# Patient Record
Sex: MALE | Race: OTHER | HISPANIC OR LATINO | Employment: UNEMPLOYED | URBAN - METROPOLITAN AREA
[De-identification: names, ages, dates, MRNs, and addresses within clinical notes are randomized per-mention and may not be internally consistent; named-entity substitution may affect disease eponyms.]

---
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
## 2025-02-11 ENCOUNTER — HOSPITAL ENCOUNTER (EMERGENCY)
Facility: HOSPITAL | Age: 20
Discharge: HOME | End: 2025-02-11
Attending: EMERGENCY MEDICINE
Payer: COMMERCIAL

## 2025-02-11 VITALS
OXYGEN SATURATION: 100 % | HEART RATE: 70 BPM | SYSTOLIC BLOOD PRESSURE: 156 MMHG | DIASTOLIC BLOOD PRESSURE: 74 MMHG | WEIGHT: 160.94 LBS | TEMPERATURE: 98.3 F | RESPIRATION RATE: 18 BRPM

## 2025-02-11 DIAGNOSIS — M25.561 ACUTE PAIN OF RIGHT KNEE: ICD-10-CM

## 2025-02-11 DIAGNOSIS — M25.562 ACUTE PAIN OF LEFT KNEE: Primary | ICD-10-CM

## 2025-02-11 DIAGNOSIS — K59.00 CONSTIPATION: ICD-10-CM

## 2025-02-11 PROCEDURE — 99284 EMERGENCY DEPT VISIT MOD MDM: CPT | Performed by: EMERGENCY MEDICINE

## 2025-02-11 PROCEDURE — 99283 EMERGENCY DEPT VISIT LOW MDM: CPT

## 2025-02-11 RX ORDER — NAPROXEN 500 MG/1
500 TABLET ORAL ONCE
Status: COMPLETED | OUTPATIENT
Start: 2025-02-11 | End: 2025-02-11

## 2025-02-11 RX ORDER — NAPROXEN 500 MG/1
500 TABLET ORAL 2 TIMES DAILY WITH MEALS
Qty: 20 TABLET | Refills: 0 | Status: SHIPPED | OUTPATIENT
Start: 2025-02-11 | End: 2025-02-21

## 2025-02-11 RX ADMIN — NAPROXEN 500 MG: 500 TABLET ORAL at 13:47

## 2025-02-11 NOTE — ED PROVIDER NOTES
"Time reflects when diagnosis was documented in both MDM as applicable and the Disposition within this note       Time User Action Codes Description Comment    2/11/2025  1:38 PM Javier Mariano [M25.562] Acute pain of left knee     2/11/2025  1:38 PM Javier Mariano [M25.561] Acute pain of right knee     2/11/2025  1:38 PM Montserrat Javier Lees [K59.00] Constipation           ED Disposition       ED Disposition   Discharge    Condition   Stable    Date/Time   Tue Feb 11, 2025  1:38 PM    Comment   Elliot Apodaca discharge to home/self care.                   Assessment & Plan       Medical Decision Making      MDM/DDx: Bilateral knee pain - contusion, musculoskeletal sprain/strain, no clinical evidence of hemarthrosis, fracture, dislocation or other derangement.                     Constipation - poor dietary intake of fiber and water, no clinical concern for bowel obstruction, colonic mass or other malignancy.    A/P: Will treat symptoms, recommend dietary changes, supportive care at home and follow-up with PCP.    Risk  Prescription drug management.             Medications   naproxen (NAPROSYN) tablet 500 mg (500 mg Oral Given 2/11/25 1347)       ED Risk Strat Scores            CRAFFT      Flowsheet Row Most Recent Value   CRAFFT Initial Screen: During the past 12 months, did you:    1. Drink any alcohol (more than a few sips)?  No Filed at: 02/11/2025 1345   2. Smoke any marijuana or hashish No Filed at: 02/11/2025 1345   3. Use anything else to get high? (\"anything else\" includes illegal drugs, over the counter and prescription drugs, and things that you sniff or 'nam')? No Filed at: 02/11/2025 1345                                          History of Present Illness       Chief Complaint   Patient presents with    Knee Pain     Bilateral knee pain after landing on both bent knees in a ball pit at the [facility]       History reviewed. No pertinent past medical history.   History reviewed. No pertinent " surgical history.   History reviewed. No pertinent family history.   Social History     Tobacco Use    Smoking status: Never    Smokeless tobacco: Never   Vaping Use    Vaping status: Never Used   Substance Use Topics    Alcohol use: Never    Drug use: Never      E-Cigarette/Vaping    E-Cigarette Use Never User       E-Cigarette/Vaping Substances    Nicotine No     THC No     CBD No     Flavoring No     Other No     Unknown No       I have reviewed and agree with the history as documented.     19-year-old male presents for complaint of bilateral knee pain after jumping into a ball pit at a recreation center yesterday.  He states he landed on his knees/shins and had immediate pain.  He was able to self assist getting out of the ball pit and continue ambulating.  He is observed ambulating into the emergency department without difficulty.  He states both knees hurt at the extremes of flexion or extension but in mid position, including standing with his knees partially flexed is comfortable.  He has not taken anything for the pain.  He denies other pain or injury from this incident.  He has an abrasion on his left shin that he states is from a different incident.    Additionally he complains of feeling like his bowel movements are inadequately evacuating each day.  However, he has no nausea or vomiting with dietary intake and maintains a normal appetite.  He has a bowel movement daily but just feels the urge to have more.    No hematochezia, melena or hematemesis. No change in symptoms w/PO intake, BM or voiding. No recent travel or similar sick contacts. Denies f/c, CP, SOB, n/v/d. 12 system ROS o/w negative.      History provided by:  Patient and medical records  Knee Pain  Location:  Knee  Time since incident:  1 day  Injury: yes    Knee location:  L knee and R knee  Pain details:     Quality:  Dull    Radiates to:  Does not radiate    Severity:  Mild    Onset quality:  Sudden    Duration:  1 day    Timing:   Intermittent    Progression:  Unchanged  Chronicity:  New  Dislocation: no    Prior injury to area:  Yes  Relieved by:  None tried  Worsened by:  Extension and flexion  Ineffective treatments:  None tried  Associated symptoms: no back pain, no decreased ROM, no fatigue, no fever, no muscle weakness, no neck pain, no numbness, no stiffness, no swelling and no tingling    Risk factors: no obesity        Review of Systems   Constitutional:  Negative for chills, diaphoresis, fatigue and fever.   HENT:  Negative for rhinorrhea, sore throat and trouble swallowing.    Respiratory:  Negative for cough, chest tightness, shortness of breath and wheezing.    Cardiovascular:  Negative for chest pain, palpitations and leg swelling.   Gastrointestinal:  Negative for abdominal distention, abdominal pain, constipation, diarrhea, nausea and vomiting.   Genitourinary:  Negative for difficulty urinating, dysuria, flank pain, frequency, hematuria and urgency.   Musculoskeletal:  Positive for arthralgias (b/l knees). Negative for back pain, myalgias, neck pain, neck stiffness and stiffness.   Skin:  Negative for pallor and rash.   Neurological:  Negative for dizziness, weakness, light-headedness and headaches.   Hematological:  Negative for adenopathy.   Psychiatric/Behavioral:  Negative for confusion. The patient is not nervous/anxious.    All other systems reviewed and are negative.          Objective       ED Triage Vitals [02/11/25 1335]   Temperature Pulse Blood Pressure Respirations SpO2 Patient Position - Orthostatic VS   98.3 °F (36.8 °C) 70 156/74 18 100 % Sitting      Temp Source Heart Rate Source BP Location FiO2 (%) Pain Score    Oral Monitor Left arm -- 6      Vitals      Date and Time Temp Pulse SpO2 Resp BP Pain Score FACES Pain Rating User   02/11/25 1347 -- -- -- -- -- 5 -- AS   02/11/25 1335 98.3 °F (36.8 °C) 70 100 % 18 156/74 6 -- SN            Physical Exam  Vitals reviewed.   Constitutional:       General: He is  not in acute distress.     Appearance: He is well-developed. He is not ill-appearing, toxic-appearing or diaphoretic.   HENT:      Head: Normocephalic and atraumatic.      Right Ear: External ear normal.      Left Ear: External ear normal.      Nose: Nose normal.      Mouth/Throat:      Mouth: Mucous membranes are moist.      Pharynx: Oropharynx is clear. No oropharyngeal exudate.   Eyes:      General: No scleral icterus.     Conjunctiva/sclera: Conjunctivae normal.      Pupils: Pupils are equal, round, and reactive to light.   Cardiovascular:      Rate and Rhythm: Normal rate and regular rhythm.      Heart sounds: No murmur heard.  Pulmonary:      Effort: Pulmonary effort is normal.      Breath sounds: Normal breath sounds.   Abdominal:      General: Bowel sounds are normal. There is no distension.      Palpations: Abdomen is soft. There is no mass.      Tenderness: There is no abdominal tenderness. There is no right CVA tenderness, left CVA tenderness, guarding or rebound.      Hernia: No hernia is present.   Musculoskeletal:         General: No swelling, tenderness, deformity or signs of injury. Normal range of motion.      Cervical back: Normal range of motion and neck supple.      Right lower leg: No edema.      Left lower leg: No edema.   Lymphadenopathy:      Cervical: No cervical adenopathy.   Skin:     General: Skin is warm and dry.      Coloration: Skin is not pale.      Findings: No erythema or rash.      Comments: Resolving linear abrasion on left shin    Neurological:      General: No focal deficit present.      Mental Status: He is alert and oriented to person, place, and time.      Motor: No abnormal muscle tone.      Deep Tendon Reflexes: Reflexes are normal and symmetric.   Psychiatric:         Behavior: Behavior normal.         Thought Content: Thought content normal.         Results Reviewed       None            No orders to display       Procedures    ED Medication and Procedure Management    None     Discharge Medication List as of 2/11/2025  1:39 PM        START taking these medications    Details   naproxen (NAPROSYN) 500 mg tablet Take 1 tablet (500 mg total) by mouth 2 (two) times a day with meals for 10 days, Starting Tue 2/11/2025, Until Fri 2/21/2025, Normal           No discharge procedures on file.  ED SEPSIS DOCUMENTATION   Time reflects when diagnosis was documented in both MDM as applicable and the Disposition within this note       Time User Action Codes Description Comment    2/11/2025  1:38 PM Javier Mariano [M25.562] Acute pain of left knee     2/11/2025  1:38 PM Javier Mariano [M25.561] Acute pain of right knee     2/11/2025  1:38 PM Javier Mariano [K59.00] Constipation                  Javier Mariano DO  02/11/25 1407